# Patient Record
Sex: MALE | Race: WHITE | NOT HISPANIC OR LATINO | Employment: OTHER | URBAN - METROPOLITAN AREA
[De-identification: names, ages, dates, MRNs, and addresses within clinical notes are randomized per-mention and may not be internally consistent; named-entity substitution may affect disease eponyms.]

---

## 2019-05-08 PROBLEM — G90.511 REFLEX SYMPATHETIC DYSTROPHY OF RIGHT UPPER EXTREMITY: Status: ACTIVE | Noted: 2019-05-08

## 2019-05-08 LAB
ALBUMIN: 3.6 G/DL (ref 3.4–5)
ALP ISOS SERPL LEV INH-CCNC: 163 U/L (ref 45–117)
ALT (SGPT): 69 U/L (ref 16–61)
ANION GAP SERPL CALC-SCNC: 4 MMOL/L (ref 4–14)
AST SERPL-CCNC: 35 U/L (ref 15–37)
BAND %: 4 %
BANDED NEUTROPHILS ABSOLUTE COUNT: 0.1 K/UL (ref 0–0.6)
BILIRUBIN TOTAL: 0.5 MG/DL (ref 0.2–1)
BUN SERPL-MCNC: 17 MG/DL (ref 7–18)
BUN/CREAT RATIO: 14.4
CALCIUM SERPL-MCNC: 8.9 MG/DL (ref 8.5–10.1)
CHLORIDE SERPL-SCNC: 105 MMOL/L (ref 98–107)
CO2 SERPL-SCNC: 30 MMOL/L (ref 21–32)
CREAT SERPL-MCNC: 1.18 MG/DL (ref 0.6–1.3)
EOSINOPHIL NFR BLD: 18 %
EOSINOPHILS ABSOLUTE COUNT: 0.47 K/UL (ref 0–0.5)
ERYTHROCYTE [DISTWIDTH] IN BLOOD BY AUTOMATED COUNT: 14.1 % (ref 12.3–17)
GFR MDRD AF AMER: >60 ML/MIN
GFR MDRD NON AF AMER: >60 ML/MIN
GLUCOSE: 117 MG/DL (ref 74–106)
HCT VFR BLD AUTO: 41.2 % (ref 36.7–47.1)
HGB BLD-MCNC: 13.2 G/DL (ref 12.5–16.3)
LYMPHOCYTES ABSOLUTE COUNT: 0.87 K/UL (ref 1–3)
LYMPHOCYTES RELATIVE PERCENT: 33 %
MCH RBC QN AUTO: 30.9 PG (ref 24.3–33.2)
MCHC RBC AUTO-ENTMCNC: 32 G/DL (ref 29.6–33.8)
MCV RBC AUTO: 96.5 FL (ref 79.3–93.5)
METAMYELOCYTE, ABS: 0.03 K/UL
METAMYELOCYTES PERCENT: 1 %
MONOCYTE, ABS: 0.71 K/UL (ref 0.3–1)
MONOCYTES %: 27 %
PLATELET COUNT: 285 K/UL (ref 159–386)
PMV BLD AUTO: 10 FL (ref 9.1–12.7)
POTASSIUM: 3.8 MMOL/L (ref 3.5–5.1)
PROT ELPH PNL UR ELPH: 17 %
RBC # BLD AUTO: 4.27 M/UL (ref 4.06–5.63)
RBC MORPH BLD: ABNORMAL
RDW-SD: 50.4 FL (ref 37.5–49)
SEGMENTED NEUTS, ABS: 0.45 K/UL (ref 1.8–7.8)
SODIUM: 139 MMOL/L (ref 136–145)
TOTAL PROTEIN: 7 G/DL (ref 6.4–8.2)
WBC # BLD AUTO: 2.63 K/UL (ref 3.6–11.2)

## 2019-12-11 PROBLEM — C81.20: Status: RESOLVED | Noted: 2018-08-10 | Resolved: 2019-12-11

## 2019-12-11 PROBLEM — D49.7 SPINAL CORD TUMOR: Status: ACTIVE | Noted: 2018-05-24

## 2019-12-11 PROBLEM — C44.311 BASAL CELL CARCINOMA (BCC) OF SKIN OF NOSE: Status: ACTIVE | Noted: 2018-11-02

## 2019-12-11 PROBLEM — C81.20: Status: ACTIVE | Noted: 2018-08-10

## 2019-12-11 PROBLEM — C83.00 SMALL B-CELL LYMPHOMA: Status: ACTIVE | Noted: 2018-09-05

## 2019-12-13 PROBLEM — B02.21 RAMSAY HUNT AURICULAR SYNDROME: Status: ACTIVE | Noted: 2019-12-13

## 2020-02-27 PROBLEM — B02.9 HERPES ZOSTER WITHOUT COMPLICATION: Status: ACTIVE | Noted: 2019-11-24

## 2020-02-27 PROBLEM — I48.0 PAROXYSMAL ATRIAL FIBRILLATION: Status: ACTIVE | Noted: 2020-02-27

## 2020-02-27 PROBLEM — I26.99 PULMONARY EMBOLISM: Status: ACTIVE | Noted: 2018-09-19

## 2020-02-27 PROBLEM — B02.8 HERPES ZOSTER COMPLICATION: Status: ACTIVE | Noted: 2019-11-24

## 2020-02-27 PROBLEM — I26.99 PULMONARY EMBOLISM: Status: ACTIVE | Noted: 2020-02-27

## 2020-02-27 PROBLEM — I48.0 PAROXYSMAL ATRIAL FIBRILLATION: Status: ACTIVE | Noted: 2018-09-19

## 2020-04-25 PROBLEM — Z51.12 ENCOUNTER FOR ANTINEOPLASTIC IMMUNOTHERAPY: Status: ACTIVE | Noted: 2020-04-25

## 2020-04-28 ENCOUNTER — TELEPHONE (OUTPATIENT)
Dept: ADMINISTRATIVE | Facility: CLINIC | Age: 60
End: 2020-04-28

## 2020-04-29 ENCOUNTER — TELEPHONE (OUTPATIENT)
Dept: ADMINISTRATIVE | Facility: CLINIC | Age: 60
End: 2020-04-29

## 2020-04-29 PROBLEM — C85.90 LYMPHOMA: Status: ACTIVE | Noted: 2020-04-29

## 2020-08-14 PROBLEM — M54.2 NECK PAIN: Status: ACTIVE | Noted: 2020-08-14

## 2021-05-12 ENCOUNTER — PATIENT MESSAGE (OUTPATIENT)
Dept: RESEARCH | Facility: HOSPITAL | Age: 61
End: 2021-05-12

## 2021-06-28 PROBLEM — R22.1 NECK MASS: Status: ACTIVE | Noted: 2021-06-28

## 2021-06-28 PROBLEM — L57.0 ACTINIC KERATOSIS: Status: ACTIVE | Noted: 2021-06-28

## 2022-02-28 PROBLEM — Z51.12 ENCOUNTER FOR ANTINEOPLASTIC IMMUNOTHERAPY: Status: RESOLVED | Noted: 2020-04-25 | Resolved: 2022-02-28

## 2022-06-27 PROBLEM — Z92.21 HISTORY OF ANTINEOPLASTIC CHEMOTHERAPY: Status: ACTIVE | Noted: 2022-06-27

## 2022-06-27 PROBLEM — R22.1 NECK MASS: Status: RESOLVED | Noted: 2021-06-28 | Resolved: 2022-06-27

## 2022-06-27 PROBLEM — D84.9 IMMUNODEFICIENCY: Status: ACTIVE | Noted: 2022-06-27

## 2023-01-20 PROBLEM — H90.3 SENSORINEURAL HEARING LOSS (SNHL) OF BOTH EARS: Status: ACTIVE | Noted: 2023-01-20

## 2023-06-20 PROBLEM — N40.1 BPH ASSOCIATED WITH NOCTURIA: Status: ACTIVE | Noted: 2023-06-20

## 2023-06-20 PROBLEM — R35.1 BPH ASSOCIATED WITH NOCTURIA: Status: ACTIVE | Noted: 2023-06-20

## 2023-07-11 PROBLEM — N13.8 BPH WITH OBSTRUCTION/LOWER URINARY TRACT SYMPTOMS: Status: ACTIVE | Noted: 2023-06-20

## 2024-03-04 ENCOUNTER — SOCIAL WORK (OUTPATIENT)
Dept: ADMINISTRATIVE | Facility: OTHER | Age: 64
End: 2024-03-04
Payer: MEDICARE

## 2024-03-04 PROBLEM — L57.8 SUN-DAMAGED SKIN: Status: ACTIVE | Noted: 2024-03-04

## 2024-03-04 PROBLEM — R97.20 ELEVATED PSA: Status: ACTIVE | Noted: 2024-03-04

## 2024-03-04 PROBLEM — K59.00 CONSTIPATION: Status: ACTIVE | Noted: 2024-03-04

## 2024-03-04 PROBLEM — G89.29 CHRONIC BILATERAL LOW BACK PAIN WITHOUT SCIATICA: Status: ACTIVE | Noted: 2024-03-04

## 2024-03-04 PROBLEM — M54.50 CHRONIC BILATERAL LOW BACK PAIN WITHOUT SCIATICA: Status: ACTIVE | Noted: 2024-03-04

## 2024-03-04 NOTE — PROGRESS NOTES
Received secure chat message from MD in regards to assisting pt with outpatient rehab at [facility]. Contacted [phone] and pt spouse(Bhargav Miner) answered and informed her that Sw will be faxing referral to [facility] and [facility] for assistance with outpatient rehab(PT/OT) and someone from the facility should contact them. Pt spouse verbalized understanding. No further needs were noted at that time. [phone] for [facility]. Will continue to follow pt and assist as needed.       Jaqueline Maciel LMSW    Ext [phone]/Pager [phone]

## 2024-03-06 ENCOUNTER — SOCIAL WORK (OUTPATIENT)
Dept: ADMINISTRATIVE | Facility: OTHER | Age: 64
End: 2024-03-06
Payer: MEDICARE

## 2024-03-06 NOTE — PROGRESS NOTES
Contacted [facility]@[phone], spoke with Kait in the Outpatient therapy department to follow up on receipt of order for PT/OT and was told that they receive many pt's for therapy and they refer them to [facility] as they can receive more therapy. Contacted pt spouse([email or web address] [phone]) and was notified. Pt spouse reported that pt have been to [facility] for outpatient rehab and was not satisfied. Pt spouse reported that is not home right now and will call  on tomorrow Thursday 3/7/24 after she have spoken with pt. Will continue to follow pt and assist.       Jaqueline Maciel LMSW    Ext [phone]/Pager [phone]